# Patient Record
Sex: FEMALE | Race: WHITE | Employment: FULL TIME
[De-identification: names, ages, dates, MRNs, and addresses within clinical notes are randomized per-mention and may not be internally consistent; named-entity substitution may affect disease eponyms.]

---

## 2022-03-18 PROBLEM — K81.9 CHOLECYSTITIS: Status: ACTIVE | Noted: 2017-03-11

## 2022-03-18 PROBLEM — K85.10 ACUTE BILIARY PANCREATITIS WITHOUT INFECTION OR NECROSIS: Status: ACTIVE | Noted: 2017-03-11

## 2022-03-19 PROBLEM — E66.9 OBESITY: Status: ACTIVE | Noted: 2017-03-11

## 2022-03-19 PROBLEM — N80.03 ADENOMYOSIS: Status: ACTIVE | Noted: 2018-01-05

## 2022-03-19 PROBLEM — Z90.710 S/P HYSTERECTOMY: Status: ACTIVE | Noted: 2018-01-04

## 2022-03-19 PROBLEM — E66.01 MORBID OBESITY DUE TO EXCESS CALORIES (HCC): Status: ACTIVE | Noted: 2017-03-11

## 2022-03-19 PROBLEM — I10 ESSENTIAL HYPERTENSION: Status: ACTIVE | Noted: 2017-03-11

## 2022-03-20 PROBLEM — R10.2 PELVIC PAIN: Status: ACTIVE | Noted: 2018-01-05

## 2022-03-20 PROBLEM — I25.10 CAD (CORONARY ARTERY DISEASE): Status: ACTIVE | Noted: 2017-03-11

## 2022-03-20 PROBLEM — K85.90 PANCREATITIS: Status: ACTIVE | Noted: 2017-03-11

## 2023-05-22 ENCOUNTER — APPOINTMENT (OUTPATIENT)
Facility: HOSPITAL | Age: 48
DRG: 066 | End: 2023-05-22
Payer: COMMERCIAL

## 2023-05-22 ENCOUNTER — HOSPITAL ENCOUNTER (INPATIENT)
Facility: HOSPITAL | Age: 48
LOS: 1 days | Discharge: HOME | DRG: 066 | End: 2023-05-24
Attending: EMERGENCY MEDICINE | Admitting: STUDENT IN AN ORGANIZED HEALTH CARE EDUCATION/TRAINING PROGRAM
Payer: COMMERCIAL

## 2023-05-22 DIAGNOSIS — G45.1 TRANSIENT ISCHEMIC ATTACK INVOLVING LEFT INTERNAL CAROTID ARTERY: ICD-10-CM

## 2023-05-22 DIAGNOSIS — I65.22 STENOSIS OF LEFT CAROTID ARTERY: ICD-10-CM

## 2023-05-22 DIAGNOSIS — G45.9 TIA (TRANSIENT ISCHEMIC ATTACK): Primary | ICD-10-CM

## 2023-05-22 DIAGNOSIS — R20.2 PARESTHESIA: ICD-10-CM

## 2023-05-22 DIAGNOSIS — I65.23 BILATERAL CAROTID ARTERY STENOSIS: ICD-10-CM

## 2023-05-22 LAB
ALBUMIN SERPL-MCNC: 4.3 G/DL (ref 3.5–5)
ALBUMIN/GLOB SERPL: 1.1 (ref 1.1–2.2)
ALP SERPL-CCNC: 68 U/L (ref 45–117)
ALT SERPL-CCNC: 36 U/L (ref 12–78)
ANION GAP SERPL CALC-SCNC: 5 MMOL/L (ref 5–15)
AST SERPL-CCNC: 25 U/L (ref 15–37)
BASOPHILS # BLD: 0.1 K/UL (ref 0–0.1)
BASOPHILS NFR BLD: 1 % (ref 0–1)
BILIRUB SERPL-MCNC: 0.5 MG/DL (ref 0.2–1)
BUN SERPL-MCNC: 23 MG/DL (ref 6–20)
BUN/CREAT SERPL: 21 (ref 12–20)
CALCIUM SERPL-MCNC: 9.7 MG/DL (ref 8.5–10.1)
CHLORIDE SERPL-SCNC: 104 MMOL/L (ref 97–108)
CO2 SERPL-SCNC: 28 MMOL/L (ref 21–32)
COMMENT:: NORMAL
CREAT SERPL-MCNC: 1.07 MG/DL (ref 0.55–1.02)
DIFFERENTIAL METHOD BLD: ABNORMAL
EOSINOPHIL # BLD: 0.4 K/UL (ref 0–0.4)
EOSINOPHIL NFR BLD: 4 % (ref 0–7)
ERYTHROCYTE [DISTWIDTH] IN BLOOD BY AUTOMATED COUNT: 11.7 % (ref 11.5–14.5)
GLOBULIN SER CALC-MCNC: 3.8 G/DL (ref 2–4)
GLUCOSE SERPL-MCNC: 131 MG/DL (ref 65–100)
HCT VFR BLD AUTO: 43 % (ref 35–47)
HGB BLD-MCNC: 14.7 G/DL (ref 11.5–16)
IMM GRANULOCYTES # BLD AUTO: 0.1 K/UL (ref 0–0.04)
IMM GRANULOCYTES NFR BLD AUTO: 1 % (ref 0–0.5)
LYMPHOCYTES # BLD: 4.9 K/UL (ref 0.8–3.5)
LYMPHOCYTES NFR BLD: 40 % (ref 12–49)
MCH RBC QN AUTO: 32 PG (ref 26–34)
MCHC RBC AUTO-ENTMCNC: 34.2 G/DL (ref 30–36.5)
MCV RBC AUTO: 93.5 FL (ref 80–99)
MONOCYTES # BLD: 0.7 K/UL (ref 0–1)
MONOCYTES NFR BLD: 6 % (ref 5–13)
NEUTS SEG # BLD: 6 K/UL (ref 1.8–8)
NEUTS SEG NFR BLD: 49 % (ref 32–75)
NRBC # BLD: 0 K/UL (ref 0–0.01)
NRBC BLD-RTO: 0 PER 100 WBC
PLATELET # BLD AUTO: 389 K/UL (ref 150–400)
POTASSIUM SERPL-SCNC: 4.3 MMOL/L (ref 3.5–5.1)
PROT SERPL-MCNC: 8.1 G/DL (ref 6.4–8.2)
RBC # BLD AUTO: 4.6 M/UL (ref 3.8–5.2)
SODIUM SERPL-SCNC: 137 MMOL/L (ref 136–145)
SPECIMEN HOLD: NORMAL
WBC # BLD AUTO: 12.2 K/UL (ref 3.6–11)

## 2023-05-22 PROCEDURE — 6360000002 HC RX W HCPCS: Performed by: EMERGENCY MEDICINE

## 2023-05-22 PROCEDURE — 93005 ELECTROCARDIOGRAM TRACING: CPT | Performed by: EMERGENCY MEDICINE

## 2023-05-22 PROCEDURE — 96375 TX/PRO/DX INJ NEW DRUG ADDON: CPT

## 2023-05-22 PROCEDURE — 6360000004 HC RX CONTRAST MEDICATION: Performed by: EMERGENCY MEDICINE

## 2023-05-22 PROCEDURE — 36415 COLL VENOUS BLD VENIPUNCTURE: CPT

## 2023-05-22 PROCEDURE — 96374 THER/PROPH/DIAG INJ IV PUSH: CPT

## 2023-05-22 PROCEDURE — 70450 CT HEAD/BRAIN W/O DYE: CPT

## 2023-05-22 PROCEDURE — 85025 COMPLETE CBC W/AUTO DIFF WBC: CPT

## 2023-05-22 PROCEDURE — 99285 EMERGENCY DEPT VISIT HI MDM: CPT

## 2023-05-22 PROCEDURE — 80053 COMPREHEN METABOLIC PANEL: CPT

## 2023-05-22 PROCEDURE — 6370000000 HC RX 637 (ALT 250 FOR IP): Performed by: EMERGENCY MEDICINE

## 2023-05-22 PROCEDURE — 70496 CT ANGIOGRAPHY HEAD: CPT

## 2023-05-22 RX ORDER — KETOROLAC TROMETHAMINE 30 MG/ML
15 INJECTION, SOLUTION INTRAMUSCULAR; INTRAVENOUS
Status: COMPLETED | OUTPATIENT
Start: 2023-05-22 | End: 2023-05-22

## 2023-05-22 RX ORDER — ASPIRIN 81 MG/1
162 TABLET, CHEWABLE ORAL ONCE
Status: COMPLETED | OUTPATIENT
Start: 2023-05-22 | End: 2023-05-22

## 2023-05-22 RX ORDER — PROCHLORPERAZINE EDISYLATE 5 MG/ML
5 INJECTION INTRAMUSCULAR; INTRAVENOUS ONCE
Status: COMPLETED | OUTPATIENT
Start: 2023-05-22 | End: 2023-05-22

## 2023-05-22 RX ORDER — DIPHENHYDRAMINE HYDROCHLORIDE 50 MG/ML
25 INJECTION INTRAMUSCULAR; INTRAVENOUS
Status: COMPLETED | OUTPATIENT
Start: 2023-05-22 | End: 2023-05-22

## 2023-05-22 RX ADMIN — PROCHLORPERAZINE EDISYLATE 5 MG: 5 INJECTION INTRAMUSCULAR; INTRAVENOUS at 20:44

## 2023-05-22 RX ADMIN — IOPAMIDOL 100 ML: 755 INJECTION, SOLUTION INTRAVENOUS at 21:28

## 2023-05-22 RX ADMIN — ASPIRIN 162 MG: 81 TABLET, CHEWABLE ORAL at 22:10

## 2023-05-22 RX ADMIN — KETOROLAC TROMETHAMINE 15 MG: 30 INJECTION, SOLUTION INTRAMUSCULAR; INTRAVENOUS at 21:49

## 2023-05-22 RX ADMIN — DIPHENHYDRAMINE HYDROCHLORIDE 25 MG: 50 INJECTION, SOLUTION INTRAMUSCULAR; INTRAVENOUS at 20:44

## 2023-05-22 ASSESSMENT — PAIN SCALES - GENERAL
PAINLEVEL_OUTOF10: 5
PAINLEVEL_OUTOF10: 5

## 2023-05-22 ASSESSMENT — LIFESTYLE VARIABLES
HOW OFTEN DO YOU HAVE A DRINK CONTAINING ALCOHOL: NEVER
HOW MANY STANDARD DRINKS CONTAINING ALCOHOL DO YOU HAVE ON A TYPICAL DAY: PATIENT DOES NOT DRINK

## 2023-05-22 ASSESSMENT — PAIN - FUNCTIONAL ASSESSMENT: PAIN_FUNCTIONAL_ASSESSMENT: 0-10

## 2023-05-22 NOTE — ED NOTES
Verbal shift change report given to Ophelia Vega RN (oncoming nurse) by Avril Perez RN (offgoing nurse). Report included the following information ED Encounter Summary, ED SBAR, [facility], Recent Results, and Neuro Assessment.       Hanna Burroughs RN  05/22/23 8015

## 2023-05-22 NOTE — ED PROVIDER NOTES
0.1 (H) 0.00 - 0.04 K/UL    Differential Type AUTOMATED     Extra Tubes Hold    Collection Time: 05/22/23  6:41 PM   Result Value Ref Range    Specimen HOld SST RED PST     Comment:        Add-on orders for these samples will be processed based on acceptable specimen integrity and analyte stability, which may vary by analyte. EKG 12 Lead    Collection Time: 05/22/23  7:09 PM   Result Value Ref Range    Ventricular Rate 77 BPM    Atrial Rate 77 BPM    P-R Interval 166 ms    QRS Duration 72 ms    Q-T Interval 378 ms    QTc Calculation (Bazett) 427 ms    P Axis 13 degrees    R Axis 7 degrees    T Axis 50 degrees    Diagnosis       Normal sinus rhythm  Inferior infarct , age undetermined  Anteroseptal infarct (cited on or before 22-MAY-2023)  When compared with ECG of 19-FEB-2019 20:33,  Inferior infarct is now present         EKG: If performed, independent interpretation documented below in the MDM section     RADIOLOGY:  Non-plain film images such as CT, Ultrasound and MRI are read by the radiologist. Plain radiographic images are visualized and preliminarily interpreted by the ED Provider with the findings documented in the MDM section. Interpretation per the Radiologist below, if available at the time of this note:     CT HEAD WO CONTRAST   Final Result   Negative. CTA HEAD NECK W CONTRAST   Final Result   Mild stenosis of the left carotid bifurcation.            PROCEDURES   Unless otherwise noted below, none  Procedures     CRITICAL CARE TIME   0    EMERGENCY DEPARTMENT COURSE and DIFFERENTIAL DIAGNOSIS/MDM   Vitals:    Vitals:    05/22/23 2100 05/22/23 2107 05/22/23 2145 05/22/23 2200   BP:  132/80 135/78 (!) 145/75   Pulse: 85 80  76   Resp: 10 16  17   Temp:       TempSrc:       SpO2: 95% 97%  97%   Weight:       Height:            Patient was given the following medications:  Medications   prochlorperazine (COMPAZINE) injection 5 mg (5 mg IntraVENous Given 5/22/23 2044)   diphenhydrAMINE (BENADRYL)

## 2023-05-23 ENCOUNTER — APPOINTMENT (OUTPATIENT)
Facility: HOSPITAL | Age: 48
DRG: 066 | End: 2023-05-23
Payer: COMMERCIAL

## 2023-05-23 ENCOUNTER — APPOINTMENT (OUTPATIENT)
Facility: HOSPITAL | Age: 48
DRG: 066 | End: 2023-05-23
Attending: STUDENT IN AN ORGANIZED HEALTH CARE EDUCATION/TRAINING PROGRAM
Payer: COMMERCIAL

## 2023-05-23 ENCOUNTER — APPOINTMENT (OUTPATIENT)
Facility: HOSPITAL | Age: 48
DRG: 066 | End: 2023-05-23
Attending: PSYCHIATRY & NEUROLOGY
Payer: COMMERCIAL

## 2023-05-23 PROBLEM — I63.312 THROMBOTIC STROKE INVOLVING LEFT MIDDLE CEREBRAL ARTERY (HCC): Status: ACTIVE | Noted: 2023-05-23

## 2023-05-23 PROBLEM — I61.9 CVA (CEREBROVASCULAR ACCIDENT DUE TO INTRACEREBRAL HEMORRHAGE) (HCC): Status: ACTIVE | Noted: 2023-05-23

## 2023-05-23 PROBLEM — G45.1 TRANSIENT ISCHEMIC ATTACK INVOLVING LEFT INTERNAL CAROTID ARTERY: Status: ACTIVE | Noted: 2023-05-23

## 2023-05-23 PROBLEM — E11.42 DIABETIC PERIPHERAL NEUROPATHY ASSOCIATED WITH TYPE 2 DIABETES MELLITUS (HCC): Status: ACTIVE | Noted: 2023-05-23

## 2023-05-23 PROBLEM — R20.0 RIGHT SIDED NUMBNESS: Status: ACTIVE | Noted: 2023-05-23

## 2023-05-23 PROBLEM — I65.23 BILATERAL CAROTID ARTERY STENOSIS: Status: ACTIVE | Noted: 2023-05-23

## 2023-05-23 LAB
ANION GAP SERPL CALC-SCNC: 5 MMOL/L (ref 5–15)
BUN SERPL-MCNC: 25 MG/DL (ref 6–20)
BUN/CREAT SERPL: 25 (ref 12–20)
CALCIUM SERPL-MCNC: 8.6 MG/DL (ref 8.5–10.1)
CHLORIDE SERPL-SCNC: 107 MMOL/L (ref 97–108)
CHOLEST SERPL-MCNC: 126 MG/DL
CO2 SERPL-SCNC: 26 MMOL/L (ref 21–32)
CREAT SERPL-MCNC: 1 MG/DL (ref 0.55–1.02)
ECHO AO ROOT DIAM: 2.9 CM
ECHO AO ROOT INDEX: 1.58 CM/M2
ECHO AR MAX VEL PISA: 4.1 M/S
ECHO AV AREA PEAK VELOCITY: 2.4 CM2
ECHO AV AREA/BSA PEAK VELOCITY: 1.3 CM2/M2
ECHO AV PEAK GRADIENT: 11 MMHG
ECHO AV PEAK VELOCITY: 1.7 M/S
ECHO AV REGURGITANT PHT: 765.2 MILLISECOND
ECHO AV VELOCITY RATIO: 0.71
ECHO BSA: 1.87 M2
ECHO BSA: 1.87 M2
ECHO LA DIAMETER INDEX: 1.8 CM/M2
ECHO LA DIAMETER: 3.3 CM
ECHO LA TO AORTIC ROOT RATIO: 1.14
ECHO LV EDV A4C: 92 ML
ECHO LV EDV INDEX A4C: 50 ML/M2
ECHO LV EJECTION FRACTION A4C: 73 %
ECHO LV ESV A4C: 25 ML
ECHO LV ESV INDEX A4C: 14 ML/M2
ECHO LV FRACTIONAL SHORTENING: 41 % (ref 28–44)
ECHO LV INTERNAL DIMENSION DIASTOLE INDEX: 2.02 CM/M2
ECHO LV INTERNAL DIMENSION DIASTOLIC: 3.7 CM (ref 3.9–5.3)
ECHO LV INTERNAL DIMENSION SYSTOLIC INDEX: 1.2 CM/M2
ECHO LV INTERNAL DIMENSION SYSTOLIC: 2.2 CM
ECHO LV IVSD: 1.3 CM (ref 0.6–0.9)
ECHO LV MASS 2D: 138.2 G (ref 67–162)
ECHO LV MASS INDEX 2D: 75.5 G/M2 (ref 43–95)
ECHO LV POSTERIOR WALL DIASTOLIC: 1 CM (ref 0.6–0.9)
ECHO LV RELATIVE WALL THICKNESS RATIO: 0.54
ECHO LVOT AREA: 3.1 CM2
ECHO LVOT DIAM: 2 CM
ECHO LVOT PEAK GRADIENT: 6 MMHG
ECHO LVOT PEAK VELOCITY: 1.2 M/S
EKG ATRIAL RATE: 77 BPM
EKG DIAGNOSIS: NORMAL
EKG P AXIS: 13 DEGREES
EKG P-R INTERVAL: 166 MS
EKG Q-T INTERVAL: 378 MS
EKG QRS DURATION: 72 MS
EKG QTC CALCULATION (BAZETT): 427 MS
EKG R AXIS: 7 DEGREES
EKG T AXIS: 50 DEGREES
EKG VENTRICULAR RATE: 77 BPM
ERYTHROCYTE [DISTWIDTH] IN BLOOD BY AUTOMATED COUNT: 11.4 % (ref 11.5–14.5)
ERYTHROCYTE [SEDIMENTATION RATE] IN BLOOD: 11 MM/HR (ref 0–20)
EST. AVERAGE GLUCOSE BLD GHB EST-MCNC: 154 MG/DL
GLUCOSE BLD STRIP.AUTO-MCNC: 102 MG/DL (ref 65–117)
GLUCOSE BLD STRIP.AUTO-MCNC: 134 MG/DL (ref 65–117)
GLUCOSE BLD STRIP.AUTO-MCNC: 157 MG/DL (ref 65–117)
GLUCOSE BLD STRIP.AUTO-MCNC: 186 MG/DL (ref 65–117)
GLUCOSE BLD STRIP.AUTO-MCNC: 196 MG/DL (ref 65–117)
GLUCOSE SERPL-MCNC: 106 MG/DL (ref 65–100)
HBA1C MFR BLD: 7 % (ref 4–5.6)
HCT VFR BLD AUTO: 38 % (ref 35–47)
HDLC SERPL-MCNC: 46 MG/DL
HDLC SERPL: 2.7 (ref 0–5)
HGB BLD-MCNC: 13.6 G/DL (ref 11.5–16)
LDLC SERPL CALC-MCNC: 35.2 MG/DL (ref 0–100)
MCH RBC QN AUTO: 33.1 PG (ref 26–34)
MCHC RBC AUTO-ENTMCNC: 35.8 G/DL (ref 30–36.5)
MCV RBC AUTO: 92.5 FL (ref 80–99)
NRBC # BLD: 0 K/UL (ref 0–0.01)
NRBC BLD-RTO: 0 PER 100 WBC
PLATELET # BLD AUTO: 366 K/UL (ref 150–400)
PMV BLD AUTO: 10.2 FL (ref 8.9–12.9)
POTASSIUM SERPL-SCNC: 4.1 MMOL/L (ref 3.5–5.1)
RBC # BLD AUTO: 4.11 M/UL (ref 3.8–5.2)
SERVICE CMNT-IMP: ABNORMAL
SERVICE CMNT-IMP: NORMAL
SODIUM SERPL-SCNC: 138 MMOL/L (ref 136–145)
TRIGL SERPL-MCNC: 224 MG/DL
VAS LEFT CCA DIST EDV: 0 CM/S
VAS LEFT CCA DIST PSV: 63.8 CM/S
VAS LEFT CCA PROX EDV: 0 CM/S
VAS LEFT CCA PROX PSV: 82 CM/S
VAS LEFT ECA EDV: 0 CM/S
VAS LEFT ECA PSV: 127.7 CM/S
VAS LEFT ICA DIST EDV: 12.9 CM/S
VAS LEFT ICA DIST PSV: 40.3 CM/S
VAS LEFT ICA MID EDV: 12.9 CM/S
VAS LEFT ICA MID PSV: 44.2 CM/S
VAS LEFT ICA PROX EDV: 18.1 CM/S
VAS LEFT ICA PROX PSV: 70.3 CM/S
VAS LEFT ICA/CCA PSV: 1.1 NO UNITS
VAS LEFT SUBCLAVIAN PROX EDV: 0 CM/S
VAS LEFT SUBCLAVIAN PROX PSV: 50.7 CM/S
VAS LEFT VERTEBRAL EDV: 8.9 CM/S
VAS LEFT VERTEBRAL PSV: 38.2 CM/S
VAS RIGHT CCA DIST EDV: 26 CM/S
VAS RIGHT CCA DIST PSV: 97.7 CM/S
VAS RIGHT CCA PROX EDV: 20.7 CM/S
VAS RIGHT CCA PROX PSV: 84.6 CM/S
VAS RIGHT ECA EDV: 0 CM/S
VAS RIGHT ECA PSV: 101.6 CM/S
VAS RIGHT ICA DIST EDV: 35.1 CM/S
VAS RIGHT ICA DIST PSV: 70.3 CM/S
VAS RIGHT ICA MID EDV: 32.5 CM/S
VAS RIGHT ICA MID PSV: 62.5 CM/S
VAS RIGHT ICA PROX EDV: 27.3 CM/S
VAS RIGHT ICA PROX PSV: 66.4 CM/S
VAS RIGHT ICA/CCA PSV: 0.7 NO UNITS
VAS RIGHT SUBCLAVIAN PROX EDV: 0 CM/S
VAS RIGHT SUBCLAVIAN PROX PSV: 87.3 CM/S
VAS RIGHT VERTEBRAL EDV: 18.1 CM/S
VAS RIGHT VERTEBRAL PSV: 46.8 CM/S
VLDLC SERPL CALC-MCNC: 44.8 MG/DL
WBC # BLD AUTO: 11.7 K/UL (ref 3.6–11)

## 2023-05-23 PROCEDURE — 93308 TTE F-UP OR LMTD: CPT

## 2023-05-23 PROCEDURE — 99222 1ST HOSP IP/OBS MODERATE 55: CPT | Performed by: PSYCHIATRY & NEUROLOGY

## 2023-05-23 PROCEDURE — A9579 GAD-BASE MR CONTRAST NOS,1ML: HCPCS | Performed by: PSYCHIATRY & NEUROLOGY

## 2023-05-23 PROCEDURE — 92523 SPEECH SOUND LANG COMPREHEN: CPT

## 2023-05-23 PROCEDURE — 36415 COLL VENOUS BLD VENIPUNCTURE: CPT

## 2023-05-23 PROCEDURE — 80048 BASIC METABOLIC PNL TOTAL CA: CPT

## 2023-05-23 PROCEDURE — 83516 IMMUNOASSAY NONANTIBODY: CPT

## 2023-05-23 PROCEDURE — 97530 THERAPEUTIC ACTIVITIES: CPT

## 2023-05-23 PROCEDURE — 97112 NEUROMUSCULAR REEDUCATION: CPT

## 2023-05-23 PROCEDURE — 93880 EXTRACRANIAL BILAT STUDY: CPT | Performed by: PSYCHIATRY & NEUROLOGY

## 2023-05-23 PROCEDURE — 6360000002 HC RX W HCPCS: Performed by: STUDENT IN AN ORGANIZED HEALTH CARE EDUCATION/TRAINING PROGRAM

## 2023-05-23 PROCEDURE — 6360000004 HC RX CONTRAST MEDICATION: Performed by: PSYCHIATRY & NEUROLOGY

## 2023-05-23 PROCEDURE — 93880 EXTRACRANIAL BILAT STUDY: CPT

## 2023-05-23 PROCEDURE — 97161 PT EVAL LOW COMPLEX 20 MIN: CPT

## 2023-05-23 PROCEDURE — 85652 RBC SED RATE AUTOMATED: CPT

## 2023-05-23 PROCEDURE — 86235 NUCLEAR ANTIGEN ANTIBODY: CPT

## 2023-05-23 PROCEDURE — 1100000000 HC RM PRIVATE

## 2023-05-23 PROCEDURE — 86225 DNA ANTIBODY NATIVE: CPT

## 2023-05-23 PROCEDURE — 6370000000 HC RX 637 (ALT 250 FOR IP): Performed by: INTERNAL MEDICINE

## 2023-05-23 PROCEDURE — 70553 MRI BRAIN STEM W/O & W/DYE: CPT

## 2023-05-23 PROCEDURE — 85027 COMPLETE CBC AUTOMATED: CPT

## 2023-05-23 PROCEDURE — 6370000000 HC RX 637 (ALT 250 FOR IP): Performed by: STUDENT IN AN ORGANIZED HEALTH CARE EDUCATION/TRAINING PROGRAM

## 2023-05-23 PROCEDURE — 97165 OT EVAL LOW COMPLEX 30 MIN: CPT

## 2023-05-23 PROCEDURE — 82962 GLUCOSE BLOOD TEST: CPT

## 2023-05-23 PROCEDURE — 80061 LIPID PANEL: CPT

## 2023-05-23 PROCEDURE — 83036 HEMOGLOBIN GLYCOSYLATED A1C: CPT

## 2023-05-23 RX ORDER — ATORVASTATIN CALCIUM 40 MG/1
40 TABLET, FILM COATED ORAL NIGHTLY
Status: DISCONTINUED | OUTPATIENT
Start: 2023-05-23 | End: 2023-05-24 | Stop reason: HOSPADM

## 2023-05-23 RX ORDER — POLYETHYLENE GLYCOL 3350 17 G/17G
17 POWDER, FOR SOLUTION ORAL DAILY PRN
Status: DISCONTINUED | OUTPATIENT
Start: 2023-05-23 | End: 2023-05-24 | Stop reason: HOSPADM

## 2023-05-23 RX ORDER — ASPIRIN 81 MG/1
81 TABLET ORAL DAILY
Status: DISCONTINUED | OUTPATIENT
Start: 2023-05-23 | End: 2023-05-23

## 2023-05-23 RX ORDER — ASPIRIN 300 MG/1
300 SUPPOSITORY RECTAL DAILY
Status: DISCONTINUED | OUTPATIENT
Start: 2023-05-23 | End: 2023-05-23

## 2023-05-23 RX ORDER — ONDANSETRON 4 MG/1
4 TABLET, ORALLY DISINTEGRATING ORAL EVERY 8 HOURS PRN
Status: DISCONTINUED | OUTPATIENT
Start: 2023-05-23 | End: 2023-05-24 | Stop reason: HOSPADM

## 2023-05-23 RX ORDER — INSULIN LISPRO 100 [IU]/ML
0-4 INJECTION, SOLUTION INTRAVENOUS; SUBCUTANEOUS NIGHTLY
Status: DISCONTINUED | OUTPATIENT
Start: 2023-05-23 | End: 2023-05-24 | Stop reason: HOSPADM

## 2023-05-23 RX ORDER — ISOSORBIDE MONONITRATE 30 MG/1
30 TABLET, EXTENDED RELEASE ORAL DAILY
Status: DISCONTINUED | OUTPATIENT
Start: 2023-05-23 | End: 2023-05-24 | Stop reason: HOSPADM

## 2023-05-23 RX ORDER — INSULIN LISPRO 100 [IU]/ML
0-8 INJECTION, SOLUTION INTRAVENOUS; SUBCUTANEOUS
Status: DISCONTINUED | OUTPATIENT
Start: 2023-05-23 | End: 2023-05-24 | Stop reason: HOSPADM

## 2023-05-23 RX ORDER — METOPROLOL SUCCINATE 25 MG/1
25 TABLET, EXTENDED RELEASE ORAL 2 TIMES DAILY
Status: DISCONTINUED | OUTPATIENT
Start: 2023-05-23 | End: 2023-05-24 | Stop reason: HOSPADM

## 2023-05-23 RX ORDER — DEXTROSE MONOHYDRATE 100 MG/ML
INJECTION, SOLUTION INTRAVENOUS CONTINUOUS PRN
Status: DISCONTINUED | OUTPATIENT
Start: 2023-05-23 | End: 2023-05-24 | Stop reason: HOSPADM

## 2023-05-23 RX ORDER — SEMAGLUTIDE 1.34 MG/ML
1 INJECTION, SOLUTION SUBCUTANEOUS WEEKLY
COMMUNITY

## 2023-05-23 RX ORDER — ENOXAPARIN SODIUM 100 MG/ML
40 INJECTION SUBCUTANEOUS DAILY
Status: DISCONTINUED | OUTPATIENT
Start: 2023-05-23 | End: 2023-05-24 | Stop reason: HOSPADM

## 2023-05-23 RX ORDER — ASPIRIN 81 MG/1
81 TABLET, CHEWABLE ORAL DAILY
Status: DISCONTINUED | OUTPATIENT
Start: 2023-05-23 | End: 2023-05-24 | Stop reason: HOSPADM

## 2023-05-23 RX ORDER — ONDANSETRON 2 MG/ML
4 INJECTION INTRAMUSCULAR; INTRAVENOUS EVERY 6 HOURS PRN
Status: DISCONTINUED | OUTPATIENT
Start: 2023-05-23 | End: 2023-05-24 | Stop reason: HOSPADM

## 2023-05-23 RX ORDER — CLOPIDOGREL BISULFATE 75 MG/1
75 TABLET ORAL DAILY
Status: DISCONTINUED | OUTPATIENT
Start: 2023-05-23 | End: 2023-05-24 | Stop reason: HOSPADM

## 2023-05-23 RX ORDER — ACETAMINOPHEN 325 MG/1
650 TABLET ORAL EVERY 6 HOURS PRN
Status: DISCONTINUED | OUTPATIENT
Start: 2023-05-23 | End: 2023-05-24 | Stop reason: HOSPADM

## 2023-05-23 RX ADMIN — GADOTERIDOL 12 ML: 279.3 INJECTION, SOLUTION INTRAVENOUS at 10:19

## 2023-05-23 RX ADMIN — SERTRALINE 150 MG: 50 TABLET, FILM COATED ORAL at 09:14

## 2023-05-23 RX ADMIN — CLOPIDOGREL BISULFATE 75 MG: 75 TABLET ORAL at 09:14

## 2023-05-23 RX ADMIN — METOPROLOL SUCCINATE 25 MG: 25 TABLET, EXTENDED RELEASE ORAL at 09:14

## 2023-05-23 RX ADMIN — ATORVASTATIN CALCIUM 40 MG: 40 TABLET, FILM COATED ORAL at 20:12

## 2023-05-23 RX ADMIN — ISOSORBIDE MONONITRATE 30 MG: 30 TABLET, EXTENDED RELEASE ORAL at 09:14

## 2023-05-23 RX ADMIN — ASPIRIN 81 MG: 81 TABLET, CHEWABLE ORAL at 09:14

## 2023-05-23 RX ADMIN — ENOXAPARIN SODIUM 40 MG: 100 INJECTION SUBCUTANEOUS at 09:14

## 2023-05-23 RX ADMIN — ACETAMINOPHEN 650 MG: 325 TABLET ORAL at 17:35

## 2023-05-23 ASSESSMENT — PAIN DESCRIPTION - LOCATION
LOCATION: HEAD
LOCATION: HEAD

## 2023-05-23 ASSESSMENT — PAIN SCALES - GENERAL
PAINLEVEL_OUTOF10: 0
PAINLEVEL_OUTOF10: 6
PAINLEVEL_OUTOF10: 5

## 2023-05-23 ASSESSMENT — PAIN DESCRIPTION - ORIENTATION: ORIENTATION: MID

## 2023-05-23 ASSESSMENT — PAIN DESCRIPTION - ONSET: ONSET: GRADUAL

## 2023-05-23 ASSESSMENT — PAIN DESCRIPTION - DESCRIPTORS: DESCRIPTORS: ACHING

## 2023-05-23 ASSESSMENT — PAIN DESCRIPTION - FREQUENCY: FREQUENCY: CONTINUOUS

## 2023-05-23 ASSESSMENT — PAIN DESCRIPTION - PAIN TYPE: TYPE: ACUTE PAIN

## 2023-05-23 NOTE — ED NOTES
1950: Assumed care of pt at this time. Pt on monitor x3 with call bell and friend at bedside. Plan of care devised with pt and all questions answered. Pt provided with blankets and pillow at this time for comfort. MD notified about pt's request for medication for headache. Mary Sweet RN  05/22/23 2002 2050: Delay in CT d/t lack of IV access. This RN placed new IV and called CT to obtain CT now that access has been obtained      Mary Sweet RN  05/22/23 1539    2125: Pt to [address] [place] [address] [place], RN  05/22/23 2134    2144: Pt back from CT and ambulatory to bathroom at this time w/o assistance. Mary Sweet RN  05/22/23 0104 1453: Bedside shift change report given to [address] [place] [address] (oncoming nurse) by Jace Loredo (offgoing nurse). Report included the following information Nurse Handoff Report, Index, ED SBAR, Intake/Output, MAR, Recent Results, and Cardiac Rhythm SINUS .           Mary Sweet RN  05/22/23 2924

## 2023-05-23 NOTE — PLAN OF CARE
Problem: Neurosensory - Adult  Goal: Achieves stable or improved neurological status  5/23/2023 1010 by Shelly Tanner RN  Outcome: Progressing

## 2023-05-23 NOTE — CARE COORDINATION
05/23/23 1520   Service Assessment   Patient Orientation Alert and Oriented   Cognition Alert   History Provided By Patient   Primary Caregiver Self   Support Systems Spouse/Significant Other   PCP Verified by CM Yes  Rosalio Pitts MD)   Last Visit to PCP Within last 3 months  (yesterday)   Prior Functional Level Independent in ADLs/IADLs   Current Functional Level Independent in ADLs/IADLs   Can patient return to prior living arrangement Yes   Ability to make needs known: Good   Family able to assist with home care needs: Yes   Would you like for me to discuss the discharge plan with any other family members/significant others, and if so, who? Yes  (spouse if needed)   Social/Functional History   Lives With Spouse   Type of [address] One level  (no stairs)   ADL Assistance Independent   Homemaking Assistance Independent   Homemaking Responsibilities Yes   Ambulation Assistance Independent   Transfer Assistance Independent   Active  Yes   Mode of Transportation Car   Occupation Full time employment   Type of Occupation titel    Discharge Planning   Type of [address] Medications No   [address] Discharge   [address] Provided? No   Mode of Transport at Discharge Other (see comment)  (family)   Condition of Participation: Discharge Planning   The Patient and/or Patient Representative was provided with a Choice of Provider? Patient   The Patient and/Or Patient Representative agree with the Discharge Plan? Yes   Freedom of Choice list was provided with basic dialogue that supports the patient's individualized plan of care/goals, treatment preferences, and shares the quality data associated with the providers? Yes         I met with patient in room-  she resides in a one level home with spouse. She states no stairs.   She is employed full time,  drives and is

## 2023-05-23 NOTE — CONSULTS
Consult  REFERRED BY:  Nithya Arias MD    CHIEF COMPLAINT: Right-sided numbness and tingling intermittently for several weeks. Subjective:     Ori Aguilar is a 57 y.o. right-handed  female, we are seeing a new patient, at the request of the hospitalist, for evaluation of some right-sided numbness and weakness has been coming and going about 5 or 6 times over the last several weeks but did occur yesterday when she was at work just doing her usual work activity and the right side got numb on the face arm and leg, and she came to the hospital for further evaluation. CTA of the head and neck is shows mild stenosis in the left carotid bifurcation that is less than 50% on the CTA and the carotid Doppler done today, and initial CT of the head was negative, but MRI of the brain does show a subacute lacunar type infarct in the left frontal region probably consistent with her symptoms. The patient feels back to normal now, and does not appear to have any objective neurologic deficit I can  on today. I will place her on aspirin and Plavix therapy, and high-dose statin, she could probably go home in a.m. if stable, if echocardiogram and telemetry is negative. She denies any cardiac symptoms at the onset of her symptoms yesterday, and has no other cardiac history. She does have a history of high blood pressure hyperlipidemia and diabetes, but does not smoke so she has 3 of the 4 main risk factors. We encouraged her to make sure she takes good care of those risk factors and monitor those with her PCP carefully. She looks a little depressed. .    Past Medical History:   Diagnosis Date    CAD (coronary artery disease)     Apical LAD disease only (2015): Med Rx. Depression     Previous rx.     Diabetes mellitus ([facility] [address]     since 19 yo; ?type 1 but takes metformin    Hyperlipidemia     Hypertension     Pancreatitis     Panic disorder (episodic paroxysmal anxiety) without agoraphobia       Past Surgical

## 2023-05-23 NOTE — H&P
Hospitalist Admission Note    NAME:  Janalee Denver   :  1975   MRN:  [id number]     Date/Time:  2023 1:19 AM    Patient PCP: Akash Garcia MD    ______________________________________________________________________  Given the patient's current clinical presentation, I have a high level of concern for decompensation if discharged from the emergency department. Complex decision making was performed, which includes reviewing the patient's available past medical records, laboratory results, and x-ray films. My assessment of this patient's clinical condition and my plan of care is as follows. Assessment / Plan: Active Problems:  TIA/CVA  CAD/PAD  Essential hypertension  Hyperlipidemia  Diabetes mellitus  MDD/SCAR    Plan:  TIA/CVA  CAD/PAD  Essential hypertension  Hyperlipidemia  Admit to telemetry monitoring  Neurology consulted, greatly appreciate their expertise  Continue PTA aspirin and Plavix  Obtain TTE  Obtain MRI brain  CT carotids demonstrating 50% stenosis of left carotid artery-consider outpatient vascular surgery consultation   PT/OT/SLP consulted  TIA/CVA order set utilized    Diabetes mellitus  Corrective coverage insulin  Accu-Cheks  Diabetic diet  Hypoglycemia protocol in place    MDD/SCAR  Continue PTA sertraline      Medical Decision Making:   I personally reviewed labs: Yes, as listed below  I personally reviewed imaging: CT head, CTA head and neck  Toxic drug monitoring: None  Discussed case with: ED provider. After discussion I am in agreement that acuity of patient's medical condition necessitates hospital stay. Code Status: Full  DVT Prophylaxis: Lovenox  GI Prophylaxis: Not indicated  Baseline:  Independent    Subjective:   CHIEF COMPLAINT: TIA/CVA    HISTORY OF PRESENT ILLNESS:     Janalee Denver is a 57 y.o.  female with PMHx significant for CAD, essential hypertension, hyperlipidemia, diabetes mellitus, MDD/SCAR who presents with complaints of right arm numbness and

## 2023-05-23 NOTE — ED NOTES
Bedside shift change report given to Marian MOREJON  (oncoming nurse) by Yandel Mai (offgoing nurse). Report included the following information Nurse Handoff Report, Index, ED SBAR, Intake/Output, MAR, Recent Results, and Cardiac Rhythm SINUS .         Mari Gomez RN  05/23/23 8432      Verbal from Marian MOREJON that pt can come w/o tele box and DARLEEN Bond has telebox waiting for her [facility] [address]  05/23/23 3355

## 2023-05-24 ENCOUNTER — TELEPHONE (OUTPATIENT)
Age: 48
End: 2023-05-24

## 2023-05-24 VITALS
HEART RATE: 72 BPM | DIASTOLIC BLOOD PRESSURE: 63 MMHG | TEMPERATURE: 98.4 F | BODY MASS INDEX: 29.19 KG/M2 | RESPIRATION RATE: 18 BRPM | SYSTOLIC BLOOD PRESSURE: 123 MMHG | OXYGEN SATURATION: 97 % | WEIGHT: 171 LBS | HEIGHT: 64 IN

## 2023-05-24 LAB
GLUCOSE BLD STRIP.AUTO-MCNC: 132 MG/DL (ref 65–117)
GLUCOSE BLD STRIP.AUTO-MCNC: 187 MG/DL (ref 65–117)
SERVICE CMNT-IMP: ABNORMAL
SERVICE CMNT-IMP: ABNORMAL

## 2023-05-24 PROCEDURE — 6370000000 HC RX 637 (ALT 250 FOR IP): Performed by: INTERNAL MEDICINE

## 2023-05-24 PROCEDURE — 82962 GLUCOSE BLOOD TEST: CPT

## 2023-05-24 PROCEDURE — 6360000002 HC RX W HCPCS: Performed by: STUDENT IN AN ORGANIZED HEALTH CARE EDUCATION/TRAINING PROGRAM

## 2023-05-24 PROCEDURE — 99232 SBSQ HOSP IP/OBS MODERATE 35: CPT | Performed by: PSYCHIATRY & NEUROLOGY

## 2023-05-24 PROCEDURE — 6370000000 HC RX 637 (ALT 250 FOR IP): Performed by: STUDENT IN AN ORGANIZED HEALTH CARE EDUCATION/TRAINING PROGRAM

## 2023-05-24 RX ORDER — ISOSORBIDE MONONITRATE 30 MG/1
30 TABLET, EXTENDED RELEASE ORAL DAILY
Qty: 30 TABLET | Refills: 1 | Status: SHIPPED | OUTPATIENT
Start: 2023-05-24

## 2023-05-24 RX ORDER — LISINOPRIL 10 MG/1
10 TABLET ORAL DAILY
Qty: 30 TABLET | Refills: 1 | Status: SHIPPED | OUTPATIENT
Start: 2023-05-24

## 2023-05-24 RX ORDER — ASPIRIN 81 MG/1
81 TABLET, CHEWABLE ORAL DAILY
Qty: 30 TABLET | Refills: 1 | Status: SHIPPED | OUTPATIENT
Start: 2023-06-25

## 2023-05-24 RX ORDER — ASPIRIN 325 MG
325 TABLET ORAL DAILY
Qty: 30 TABLET | Refills: 0 | Status: SHIPPED | OUTPATIENT
Start: 2023-05-24 | End: 2023-06-23

## 2023-05-24 RX ORDER — ASPIRIN 81 MG/1
81 TABLET, CHEWABLE ORAL DAILY
Qty: 30 TABLET | Refills: 1 | Status: SHIPPED | OUTPATIENT
Start: 2023-05-24 | End: 2023-05-24 | Stop reason: SDUPTHER

## 2023-05-24 RX ORDER — CLOPIDOGREL BISULFATE 75 MG/1
75 TABLET ORAL DAILY
Qty: 30 TABLET | Refills: 1 | Status: SHIPPED | OUTPATIENT
Start: 2023-05-24

## 2023-05-24 RX ORDER — METOPROLOL SUCCINATE 25 MG/1
25 TABLET, EXTENDED RELEASE ORAL 2 TIMES DAILY
Qty: 30 TABLET | Refills: 1 | Status: SHIPPED | OUTPATIENT
Start: 2023-05-24

## 2023-05-24 RX ORDER — ATORVASTATIN CALCIUM 80 MG/1
40 TABLET, FILM COATED ORAL DAILY
Qty: 30 TABLET | Refills: 1 | Status: SHIPPED | OUTPATIENT
Start: 2023-05-24

## 2023-05-24 RX ADMIN — ISOSORBIDE MONONITRATE 30 MG: 30 TABLET, EXTENDED RELEASE ORAL at 08:52

## 2023-05-24 RX ADMIN — METOPROLOL SUCCINATE 25 MG: 25 TABLET, EXTENDED RELEASE ORAL at 08:52

## 2023-05-24 RX ADMIN — ENOXAPARIN SODIUM 40 MG: 100 INJECTION SUBCUTANEOUS at 08:52

## 2023-05-24 RX ADMIN — ASPIRIN 81 MG: 81 TABLET, CHEWABLE ORAL at 08:52

## 2023-05-24 RX ADMIN — CLOPIDOGREL BISULFATE 75 MG: 75 TABLET ORAL at 08:52

## 2023-05-24 RX ADMIN — SERTRALINE 150 MG: 50 TABLET, FILM COATED ORAL at 08:52

## 2023-05-24 RX ADMIN — ACETAMINOPHEN 650 MG: 325 TABLET ORAL at 06:20

## 2023-05-24 ASSESSMENT — PAIN SCALES - GENERAL
PAINLEVEL_OUTOF10: 5
PAINLEVEL_OUTOF10: 3

## 2023-05-24 ASSESSMENT — PAIN DESCRIPTION - ORIENTATION: ORIENTATION: MID

## 2023-05-24 ASSESSMENT — PAIN DESCRIPTION - DESCRIPTORS
DESCRIPTORS: DULL
DESCRIPTORS: ACHING

## 2023-05-24 ASSESSMENT — PAIN DESCRIPTION - LOCATION
LOCATION: HEAD
LOCATION: HEAD;CHEST

## 2023-05-24 NOTE — PROGRESS NOTES
02:32  Dr. Jaime De La Paz made aware of change to [facility] assessment. No new orders received. 2324: Bedside shift change report given to Pierre Mitchell RN (oncoming nurse) by Ct Billingsley RN (offgoing nurse).  Report included the following information Nurse Handoff Report, Index, ED SBAR, Intake/Output, MAR, Recent Results, and Cardiac Rhythm SINUS
End of Shift Note    Bedside shift change report given to Gila Fry RN (oncoming nurse) by Darrell Cherry RN (offgoing nurse). Report included the following information SBAR, MAR, I&O      Shift worked:  Nights   Shift summary and any significant changes:     Pt states symptoms resolved. No significant events over night. Possible D/C tomorrow. Concerns for physician to address:  N/A   Zone phone for oncoming shift:   [phone]     Patient Information  Susy Walters  57 y.o.  5/22/2023  6:51 PM by Tess Teran DO. Gregor Bermudez was admitted from Home    Problem List  Patient Active Problem List    Diagnosis Date Noted    Bilateral carotid artery stenosis 05/23/2023    Transient ischemic attack involving left internal carotid artery 05/23/2023    Diabetic peripheral neuropathy associated with type 2 diabetes mellitus ([address] 05/23/2023    Thrombotic stroke involving left middle cerebral artery ([address] 05/23/2023    Right sided numbness 05/23/2023    Adenomyosis 01/05/2018    Pelvic pain 01/05/2018    S/P hysterectomy 01/04/2018    Acute biliary pancreatitis without infection or necrosis 03/11/2017    Cholecystitis 03/11/2017    Essential hypertension 03/11/2017    Morbid obesity due to excess calories ([address] 03/11/2017    Obesity 03/11/2017    CAD (coronary artery disease) 03/11/2017    Pancreatitis 03/11/2017    Unstable angina ([address] 05/30/2015     Past Medical History:   Diagnosis Date    CAD (coronary artery disease)     Apical LAD disease only (2015): Med Rx. Depression     Previous rx.     Diabetes mellitus ([address]     since 19 yo; ?type 1 but takes metformin    Hyperlipidemia     Hypertension     Pancreatitis     Panic disorder (episodic paroxysmal anxiety) without agoraphobia        Core Measures:  CVA: Yes Yes  CHF:No No  PNA:NoNo    Activity:  Activity: In bed  Number times ambulated in hallways past shift: 0  Number of times OOB to chair past shift: 3    Cardiac:   Cardiac Monitoring: Yes      Cardiac Rhythm:
Hospitalist Progress Note    NAME:   Wendi Henley   : 1975   MRN: [id number]     Date/Time: 2023 3:01 PM  Patient PCP: Ashlie Sood MD    Estimated discharge date:   Barriers: ECHO and Neuro clearance       Assessment / Plan:    Acute/subacute CVA involving left frontal lobe  -MRI positive for CVA  -Continue added aspirin and Plavix. -Hemoglobin A1c 7.0 and LDL is 35.2  -Continue Continue Lipitor but consider decreasing the dose since LDL is only 35  -2D echocardiogram is pending  -neurology consulted and appreciate recommendations  -PT OT consulted  -EKG shows normal sinus rhythm  -Neurology is recommending overnight telemetry monitoring  -she will need a hypercoagulable panel on outpatient basis since she is less than 50 and had an acute CVA    Diabetes mellitus  Corrective coverage insulin  Accu-Cheks  Diabetic diet  Hypoglycemia protocol in place     MDD/SCAR  Continue PTA sertraline        Medical Decision Making:   I personally reviewed labs: CBC, BMP  I personally reviewed imaging: MRI of the brain  Toxic drug monitoring: Monitor hemoglobin while on Lovenox  Discussed case with:         Code Status: Full code  DVT Prophylaxis: Lovenox  GI Prophylaxis:    Subjective:     Chief Complaint / Reason for Physician Visit  Right-sided weakness is resolved. Speech is back to her normal as well  No new weakness, tingling or numbness  Overnight events noted        Objective:     VITALS:   Last 24hrs VS reviewed since prior progress note.  Most recent are:  Patient Vitals for the past 24 hrs:   BP Temp Temp src Pulse Resp SpO2 Height Weight   23 1447 (!) 150/67 -- -- -- -- -- 1.626 m (5' 4.02\") 77.6 kg (171 lb)   23 1012 -- -- Oral -- -- -- -- --   23 0755 (!) 150/67 97.5 °F (36.4 °C) -- 78 17 96 % -- --   23 0413 (!) 143/83 97.9 °F (36.6 °C) -- 79 20 95 % -- --   23 0335 133/73 97.8 °F (36.6 °C) Oral 76 17 96 % -- --   23 0200 -- -- -- -- -- -- 1.626 m (5' 4\") --
MRI PENDING    Completion of MRI Screening Sheet    Fax to [phone] when completed    Please call [phone]  When this is done    Thank You
Occupational Therapy    Orders received, acknowledged, and patient chart reviewed up to date. Attempted visit, however patient currently unavailable 2/2 BOB for MRI. Will defer and continue to follow for OT evaluation. Thank you.   Wes Palmer OTR/L
PHYSICAL THERAPY EVALUATION/DISCHARGE    Patient: Naresh Mc (51 y.o. female)  Date: 2023  Primary Diagnosis: TIA (transient ischemic attack) [G45.9]  Paresthesia [R20.2]  CVA (cerebrovascular accident due to intracerebral hemorrhage) (HCC) [I61.9]  Stenosis of left carotid artery [I65.22]       Precautions:       ASSESSMENT AND RECOMMENDATIONS:  Based on the objective data below, the patient presents with indep LOF with mobility following admit for neuro work up due to 3 week history of intermittent L UE/ facial numbness and difficulty speech. CTA showed mild stenosis L carotid bifurcation; brain MRI showed small subacute white matter frontal infarct and prominent white matter disease for age. Pt demo indep with bed mob, transfers, and amb on unit. Noted instability with high level balance testing involving SLS/ narrow ANA. Pt without acute PT needs at this time. Discussed follow up outpt PT for high level balance training, pt not interested at this time. Further skilled acute physical therapy is not indicated at this time. PLAN :  Recommendation for discharge: (in order for the patient to meet his/her long term goals): No skilled physical therapy    Other factors to consider for discharge: no additional factors    IF patient discharges home will need the following DME: none       SUBJECTIVE:   Patient stated I just have a headache.     OBJECTIVE DATA SUMMARY:     Past Medical History:   Diagnosis Date    CAD (coronary artery disease)     Apical LAD disease only (): Med Rx. Depression     Previous rx.     Diabetes mellitus ([facility] [address]     since 17 yo; ?type 1 but takes metformin    Hyperlipidemia     Hypertension     Pancreatitis     Panic disorder (episodic paroxysmal anxiety) without agoraphobia      Past Surgical History:   Procedure Laterality Date    CARDIAC CATHETERIZATION       SECTION      X 2    GI      COLONOSCOPY    GYN      ablation    HYSTERECTOMY (CERVIX STATUS UNKNOWN)
Physical Therapy    Order acknowledged, chart reviewed. Pt unavailable for PT evaluation due to off floor for MRI. Will con't to follow.     Garrett Marsh, PT, MPT
Speech LAnguage Pathology EVALUATION    Patient: Vandana Miller (51 y.o. female)  Date: 2023  Primary Diagnosis: TIA (transient ischemic attack) [G45.9]  Paresthesia [R20.2]  CVA (cerebrovascular accident due to intracerebral hemorrhage) ([facility] [address] [I61.9]  Stenosis of left carotid artery [I65.22]       Precautions:                     ASSESSMENT :  Based on the objective data described below, the patient presents with functional motor speech and basic language. Auditory comprehension for complex commands and questions was wnl. R/L discrim was wnl. Verbal expression was wnl. Naming on confrontation was wnl. She had mildly reduced divergent naming in one of 2 categories. Repetition of complex words and sentences was wnl. She wrote her name and address accurately and a few sentences. One spelling error noted. She felt like she had fully resolved. She has risk factors for stroke and her MRI showed subacute L frontal infarct. She has had symptoms over the past month. Patient will be discharged from skilled speech-language pathology services at this time. PLAN :  Recommendations and Planned Interventions:  Diet: Regular  thins     Acute SLP Services: No, patient will be discharged from acute skilled speech-language pathology at this time. Discharge Recommendations: None     SUBJECTIVE:   Patient stated, she was a title . OBJECTIVE:     Past Medical History:   Diagnosis Date    CAD (coronary artery disease)     Apical LAD disease only (): Med Rx. Depression     Previous rx.     Diabetes mellitus ([facility] [address]     since 19 yo; ?type 1 but takes metformin    Hyperlipidemia     Hypertension     Pancreatitis     Panic disorder (episodic paroxysmal anxiety) without agoraphobia      Past Surgical History:   Procedure Laterality Date    CARDIAC CATHETERIZATION       SECTION      X 2    GI      COLONOSCOPY    GYN      ablation    HYSTERECTOMY      TONSILLECTOMY      UPPER GI
bed, PT/OT consult, and nutritional support    Patient Safety:  Fall Score:  Cid Total Score: 0  Interventions: bed/chair alarm, gripper socks, and pt to call before getting OOB     @Rollbelt  @dexterity to release roll belt  Yes/No ( must document dexterity  here by stating Yes or No here, otherwise this is a restraint and must follow restraint documentation policy.)    DVT prophylaxis:  DVT prophylaxis Med- yes  DVT prophylaxis SCD or EVA- no     Wounds: (If Applicable)  Wounds- N/A  Location     Active Consults:  IP CONSULT TO HOSPITALIST  IP CONSULT TO NEUROLOGY    Length of Stay:  Expected LOS: 2  Actual LOS: 0  Discharge Plan:  Yes, when stable      Angie Alexander RN
(PLAVIX) 75 mg tab, Disp: 30 tablet, Rfl: 1    [START ON 6/25/2023] aspirin 81 MG chewable tablet, Take 1 tablet by mouth daily, Disp: 30 tablet, Rfl: 1    aspirin 325 MG tablet, Take 1 tablet by mouth daily, Disp: 30 tablet, Rfl: 0    Semaglutide, 1 MG/DOSE, (OZEMPIC, 1 MG/DOSE,) 2 MG/1.5ML SOPN, Inject 1 mg into the skin once a week, Disp: , Rfl:     insulin glargine (LANTUS SOLOSTAR) 100 UNIT/ML injection pen, 25 unit once daily, ANY FURTHER REFILLS NEED TO COME FROM PCP AS DR DAMON IS NO LONGER WITH THIS PRACTICE, Disp: , Rfl:     metFORMIN (GLUCOPHAGE-XR) 500 MG extended release tablet, TAKE 2 TABLET BY MOUTH BEFORE BREAKFAST AND DINNER, Disp: , Rfl:     sertraline (ZOLOFT) 100 MG tablet, 1.5 tablets, Disp: , Rfl:     butalbital-APAP-caffeine -40 MG CAPS per capsule, Take 1 capsule by mouth every 4 hours as needed, Disp: , Rfl:     ondansetron (ZOFRAN-ODT) 4 MG disintegrating tablet, Take 1 tablet by mouth every 8 hours as needed, Disp: , Rfl:         No Known Allergies   MRI Results (most recent):  @[facility]([email or web address]:1)@    @[facility]([id number]:1)@  Review of Systems:  A comprehensive review of systems was negative except for: Neurological: positive for paresthesia and weakness   Vitals:    05/23/23 2334 05/24/23 0312 05/24/23 0850 05/24/23 1050   BP: (!) 119/55 (!) 108/57 132/63 123/63   Pulse: 69 71 75 72   Resp: 18 16 16 18   Temp: 97.5 °F (36.4 °C) 97.9 °F (36.6 °C) 97.5 °F (36.4 °C) 98.4 °F (36.9 °C)   TempSrc: Oral  Oral Oral   SpO2: 98% 96% 92% 97%   Weight:       Height:       PF:         Objective:     I      NEUROLOGICAL EXAM:    Appearance: The patient is well developed, well nourished, provides a coherent history and is in no acute distress. Mental Status: Oriented to time, place and person, and the president, cognitive function is normal and speech is fluent and no aphasia or dysarthria. Mood and affect appropriate, appears a little depressed. .   Cranial Nerves:   Intact
registered nurse    Patient Education  Education Given To: Patient  Education Provided: Role of Therapy;Plan of Care;Energy Conservation;IADL Safety; Fall Prevention Strategies; ADL Adaptive Strategies  Education Method: Verbal;Demonstration  Barriers to Learning: None  Education Outcome: Verbalized understanding    Thank you for this referral.  Rambo Cooley OT  Minutes: 19    Occupational Therapy Evaluation Charge Determination   History Examination Decision-Making   LOW Complexity : Brief history review  LOW Complexity: 1-3 Performance deficits relating to physical, cognitive, or psychosocial skills that result in activity limitations and/or participation restrictions LOW Complexity: No comorbidities that affect functional and  no verbal  or physical assist needed to complete eval tasks   Based on the above components, the patient evaluation is determined to be of the following complexity level: Low

## 2023-05-24 NOTE — TELEPHONE ENCOUNTER
Pt called. Verified patient with two patient identifiers. States she had numbness right arm, lasted one minute. Denied other symptoms, anywhere else. Is back to normal right after that. Just d/c from hospital.  Lives 1.5 hrs away. Has had her ASA and Plavix today. Spoke with Dr. Abby Alejandre. Advised sometimes there will be minor fluctuations in the symptoms because of the nerves or damage by the stroke. Occasionally tend to misfire and cause symptoms, particularly when it is very brief like that. Advised  if symptoms increase in frequency, severity, etc. Call 911. Patient verbalized understanding.

## 2023-05-24 NOTE — PLAN OF CARE
Problem: Discharge Planning  Goal: Discharge to home or other facility with appropriate resources  5/24/2023 0908 by Prince Ramos RN  Outcome: Progressing  5/24/2023 0217 by Chavez Toney RN  Outcome: Progressing     Problem: Neurosensory - Adult  Goal: Achieves stable or improved neurological status  5/24/2023 0908 by Prince Ramos RN  Outcome: Progressing  5/24/2023 0217 by Chavez Toney RN  Outcome: Progressing  Goal: Absence of seizures  5/24/2023 0908 by Prince Ramos RN  Outcome: Progressing  5/24/2023 0217 by Chavez Toney RN  Outcome: Progressing  Goal: Remains free of injury related to seizures activity  5/24/2023 0908 by Prince Ramos RN  Outcome: Progressing  5/24/2023 0217 by Chavez Toney RN  Outcome: Progressing  Goal: Achieves maximal functionality and self care  5/24/2023 0908 by Prince Ramos RN  Outcome: Progressing  5/24/2023 0217 by Chavez Toney RN  Outcome: Progressing     Problem: Cardiovascular - Adult  Goal: Maintains optimal cardiac output and hemodynamic stability  5/24/2023 0908 by Prince Ramos RN  Outcome: Progressing  5/24/2023 0217 by Chavez Toney RN  Outcome: Progressing  Goal: Absence of cardiac dysrhythmias or at baseline  5/24/2023 0908 by Prince Ramos RN  Outcome: Progressing  5/24/2023 0217 by Chavez Toney RN  Outcome: Progressing     Problem: Musculoskeletal - Adult  Goal: Return mobility to safest level of function  5/24/2023 0908 by Prince Ramos RN  Outcome: Progressing  5/24/2023 0217 by Chavez Toney RN  Outcome: Progressing  Goal: Maintain proper alignment of affected body part  5/24/2023 0908 by Prince Ramos RN  Outcome: Progressing  5/24/2023 0217 by Chavez Toney RN  Outcome: Progressing  Goal: Return ADL status to a safe level of function  5/24/2023 0908 by Prince Ramos RN  Outcome: Progressing  5/24/2023 0217 by Chavez Toney RN  Outcome: Progressing     Problem: Safety - Adult  Goal: Free from fall injury  5/24/2023 0908 by Prince Ramos

## 2023-05-24 NOTE — DISCHARGE INSTRUCTIONS
Patient Discharge Instructions    Chase Davies / [id number] : 1975    Admitted 2023 Discharged: 2023         DISCHARGE DIAGNOSIS:   Acute/subacute CVA involving left frontal lobe  Diabetes mellitus  HTN  MDD/SCAR      Take Home Medications     {Medication reconciliation information is now added to the patient's AVS automatically when it is printed. There is no need to use this SmartLink in discharge instructions. Highlight this text and delete it to clear this message}      General drug facts     If you have a very bad allergy, wear an allergy ID at all times. It is important that you take the medication exactly as they are prescribed. Keep your medication in the bottles provided by the pharmacist.  Keep a list of all your drugs (prescription, natural products, vitamins, OTC) with you. Give this list to your doctor. Do not take other medications without consulting your doctor. Do not share your drugs with others and do not take anyone else's drugs. Keep all drugs out of the reach of children and pets. Most drugs may be thrown away in household trash after mixing with coffee grounds or herrera litter and sealing in a plastic bag. Keep a list Call your doctor for help with any side effects. If in the [place] you may also call the FDA at 9-245-FDA-6110    Talk with the doctor before starting any new drug, including OTC, natural products, or vitamins. What to do at Home    1. Recommended diet: Diabetic     2. Recommended activity: activity as tolerated    3. If you experience any of the following symptoms then please call your primary care physician or return to the emergency room if you cannot get hold of your doctor:    4. Wound Care: None     5. Lab work: cbc and bmp in 1 week     6 . Bring these papers with you to your follow up appointments.  The papers will help your doctors be sure to continue the care plan from the hospital.      If you have questions regarding the hospital

## 2023-05-24 NOTE — PLAN OF CARE
Problem: Discharge Planning  Goal: Discharge to home or other facility with appropriate resources  5/24/2023 1212 by Isadora Samayoa RN  Outcome: Completed  5/24/2023 0908 by Isadora Samayoa RN  Outcome: Progressing  5/24/2023 0217 by Gloria Moon RN  Outcome: Progressing     Problem: Neurosensory - Adult  Goal: Achieves stable or improved neurological status  5/24/2023 1212 by Isadora Samayoa RN  Outcome: Completed  5/24/2023 0908 by Isadora Samayoa RN  Outcome: Progressing  5/24/2023 0217 by Gloria Moon RN  Outcome: Progressing  Goal: Absence of seizures  5/24/2023 1212 by Isadora Samayoa RN  Outcome: Completed  5/24/2023 0908 by Isadora Samayoa RN  Outcome: Progressing  5/24/2023 0217 by Gloria Moon RN  Outcome: Progressing  Goal: Remains free of injury related to seizures activity  5/24/2023 1212 by Isadora Samayoa RN  Outcome: Completed  5/24/2023 0908 by Isadora Samayoa RN  Outcome: Progressing  5/24/2023 0217 by Gloria Moon RN  Outcome: Progressing  Goal: Achieves maximal functionality and self care  5/24/2023 1212 by Isadora Samayoa RN  Outcome: Completed  5/24/2023 0908 by Isadora Samayoa RN  Outcome: Progressing  5/24/2023 0217 by Gloria Moon RN  Outcome: Progressing     Problem: Cardiovascular - Adult  Goal: Maintains optimal cardiac output and hemodynamic stability  5/24/2023 1212 by Isadora Samayoa RN  Outcome: Completed  5/24/2023 0908 by Isadora Samayoa RN  Outcome: Progressing  5/24/2023 0217 by Gloria Moon RN  Outcome: Progressing  Goal: Absence of cardiac dysrhythmias or at baseline  5/24/2023 1212 by Isadora Samayoa RN  Outcome: Completed  5/24/2023 0908 by Isadora Samayoa RN  Outcome: Progressing  5/24/2023 0217 by Gloria Moon RN  Outcome: Progressing     Problem: Musculoskeletal - Adult  Goal: Return mobility to safest level of function  5/24/2023 1212 by Jerusalem Hotter, RN  Outcome: Completed  5/24/2023 0908 by Isadora Samayoa RN  Outcome: Progressing  5/24/2023 0217 by Gloria Moon RN  Outcome:

## 2023-05-25 LAB
CENTROMERE B AB SER-ACNC: <0.2 AI (ref 0–0.9)
CHROMATIN AB SERPL-ACNC: <0.2 AI (ref 0–0.9)
DSDNA AB SER-ACNC: <1 IU/ML (ref 0–9)
ENA JO1 AB SER-ACNC: <0.2 AI (ref 0–0.9)
ENA RNP AB SER-ACNC: 0.5 AI (ref 0–0.9)
ENA SCL70 AB SER-ACNC: <0.2 AI (ref 0–0.9)
ENA SM AB SER-ACNC: <0.2 AI (ref 0–0.9)
ENA SM+RNP AB SER-ACNC: <0.2 AI (ref 0–0.9)
ENA SS-A AB SER-ACNC: <0.2 AI (ref 0–0.9)
ENA SS-B AB SER-ACNC: <0.2 AI (ref 0–0.9)
RIBOSOMAL P AB SER-ACNC: <0.2 AI (ref 0–0.9)
SEE BELOW:, 164879: NORMAL

## 2023-07-26 ENCOUNTER — TELEPHONE (OUTPATIENT)
Age: 48
End: 2023-07-26

## 2023-07-26 NOTE — TELEPHONE ENCOUNTER
Pt calling to request refills on the following:    isosorbide mononitrate (IMDUR) 30 MG extended release tablet    lisinopril (PRINIVIL;ZESTRIL) 10 MG tablet    metoprolol succinate (TOPROL XL) 25 MG extended release tablet      To be sent to: [facility] [place] [facility] [phone] -  [phone]

## 2023-07-27 NOTE — TELEPHONE ENCOUNTER
Spoke with patient. Verified patient with two patient identifiers. Advised to call cardiologist or PCP to fill these meds, we do not fill these. Patient verbalized understanding.         Nata Delgado MD  You 17 hours ago (9:29 PM)     TS  Yes, I agree send these to the PCP

## 2023-08-09 ENCOUNTER — OFFICE VISIT (OUTPATIENT)
Age: 48
End: 2023-08-09
Payer: COMMERCIAL

## 2023-08-09 VITALS
DIASTOLIC BLOOD PRESSURE: 62 MMHG | WEIGHT: 174 LBS | RESPIRATION RATE: 18 BRPM | HEIGHT: 64 IN | OXYGEN SATURATION: 98 % | BODY MASS INDEX: 29.71 KG/M2 | SYSTOLIC BLOOD PRESSURE: 92 MMHG | TEMPERATURE: 97.2 F | HEART RATE: 69 BPM

## 2023-08-09 DIAGNOSIS — R47.89 WORD FINDING DIFFICULTY: ICD-10-CM

## 2023-08-09 DIAGNOSIS — I69.328 SPEECH AND LANGUAGE DEFICIT AS LATE EFFECT OF CEREBROVASCULAR ACCIDENT (CVA): ICD-10-CM

## 2023-08-09 DIAGNOSIS — I63.81 STROKE, LACUNAR (HCC): Primary | ICD-10-CM

## 2023-08-09 DIAGNOSIS — R51.9 MILD HEADACHE: ICD-10-CM

## 2023-08-09 PROCEDURE — 99214 OFFICE O/P EST MOD 30 MIN: CPT | Performed by: NURSE PRACTITIONER

## 2023-08-09 PROCEDURE — 3078F DIAST BP <80 MM HG: CPT | Performed by: NURSE PRACTITIONER

## 2023-08-09 PROCEDURE — 3074F SYST BP LT 130 MM HG: CPT | Performed by: NURSE PRACTITIONER

## 2023-08-09 RX ORDER — EZETIMIBE 10 MG/1
TABLET ORAL
COMMUNITY
Start: 2023-07-18

## 2023-08-09 ASSESSMENT — PATIENT HEALTH QUESTIONNAIRE - PHQ9
2. FEELING DOWN, DEPRESSED OR HOPELESS: 1
SUM OF ALL RESPONSES TO PHQ QUESTIONS 1-9: 2
SUM OF ALL RESPONSES TO PHQ9 QUESTIONS 1 & 2: 2
SUM OF ALL RESPONSES TO PHQ QUESTIONS 1-9: 2
1. LITTLE INTEREST OR PLEASURE IN DOING THINGS: 0
SUM OF ALL RESPONSES TO PHQ QUESTIONS 1-9: 0
SUM OF ALL RESPONSES TO PHQ QUESTIONS 1-9: 0
SUM OF ALL RESPONSES TO PHQ QUESTIONS 1-9: 2
2. FEELING DOWN, DEPRESSED OR HOPELESS: 0
SUM OF ALL RESPONSES TO PHQ9 QUESTIONS 1 & 2: 0
SUM OF ALL RESPONSES TO PHQ QUESTIONS 1-9: 0
1. LITTLE INTEREST OR PLEASURE IN DOING THINGS: 1
SUM OF ALL RESPONSES TO PHQ QUESTIONS 1-9: 0
SUM OF ALL RESPONSES TO PHQ QUESTIONS 1-9: 2

## 2023-08-09 ASSESSMENT — ENCOUNTER SYMPTOMS: PHOTOPHOBIA: 0

## 2023-08-09 NOTE — PROGRESS NOTES
Chief Complaint   Patient presents with    Follow-Up from [facility] - 5/22-5/24/23 for acute CVA-Still having weakness in right arm - having speech issues where words run together - short term memory loss      1. Have you been to the ER, urgent care clinic since your last visit? Hospitalized since your last visit? No     2. Have you seen or consulted any other health care providers outside of the [address] [place] [address] Avenue since your last visit? Include any pap smears or colon screening.   No
Diagnostic images were reviewed with patient, she is to continue the 81 mg aspirin and Plavix 75 mg as recommended by cardiology. Healthy lifestyle was encouraged. Her stroke risk factors were reviewed to include her history of cardiac disease and diabetes. This patient language deficit as late effect CVA: Patient notes this not to be a problematic issue however if any worsening of her signs or symptoms or issues with performing with her IADLs patient to contact me at which time I could order speech therapy. Mild headache: Patient is continue monitor signs and symptoms she will contact us for any worsening presentation. Word finding difficulty: Patient is continue monitoring signs and symptoms, speech therapy if any worsening. Consider    Patient and/or family verbalized understand of all instructions and all questions/concerns were addressed. Safety/side effects of medications discussed. Patient remains a complex patient secondary to polypharmacy, significant comorbid conditions, and use of high-risk medications which complicate the decision making process related to patient's neurologic diagnosis. The patient is to follow up in 9 months, sooner if needed.     Amber Li, MNAOLOP-BC

## 2024-07-25 ENCOUNTER — HOSPITAL ENCOUNTER (EMERGENCY)
Facility: HOSPITAL | Age: 49
Discharge: HOME | End: 2024-07-26

## 2024-07-25 ENCOUNTER — APPOINTMENT (OUTPATIENT)
Facility: HOSPITAL | Age: 49
End: 2024-07-25

## 2024-07-25 DIAGNOSIS — R82.998 LEUKOCYTES IN URINE: ICD-10-CM

## 2024-07-25 DIAGNOSIS — R10.10 UPPER ABDOMINAL PAIN: Primary | ICD-10-CM

## 2024-07-25 DIAGNOSIS — R07.89 ATYPICAL CHEST PAIN: ICD-10-CM

## 2024-07-25 DIAGNOSIS — R74.8 ELEVATED LIPASE: ICD-10-CM

## 2024-07-25 DIAGNOSIS — D72.829 LEUKOCYTOSIS, UNSPECIFIED TYPE: ICD-10-CM

## 2024-07-25 LAB
ALBUMIN SERPL-MCNC: 4.1 G/DL (ref 3.4–5)
ALBUMIN/GLOB SERPL: 1.3 (ref 0.8–1.7)
ALP SERPL-CCNC: 71 U/L (ref 45–117)
ALT SERPL-CCNC: 39 U/L (ref 13–56)
ANION GAP SERPL CALC-SCNC: 6 MMOL/L (ref 3–18)
AST SERPL-CCNC: 36 U/L (ref 10–38)
BASOPHILS # BLD: 0.1 K/UL (ref 0–0.1)
BASOPHILS NFR BLD: 1 % (ref 0–2)
BILIRUB SERPL-MCNC: 0.3 MG/DL (ref 0.2–1)
BUN SERPL-MCNC: 17 MG/DL (ref 7–18)
BUN/CREAT SERPL: 20 (ref 12–20)
CALCIUM SERPL-MCNC: 9.5 MG/DL (ref 8.5–10.1)
CHLORIDE SERPL-SCNC: 105 MMOL/L (ref 100–111)
CO2 SERPL-SCNC: 29 MMOL/L (ref 21–32)
CREAT SERPL-MCNC: 0.85 MG/DL (ref 0.6–1.3)
D DIMER PPP FEU-MCNC: 0.31 UG/ML(FEU)
DIFFERENTIAL METHOD BLD: ABNORMAL
EOSINOPHIL # BLD: 0.4 K/UL (ref 0–0.4)
EOSINOPHIL NFR BLD: 3 % (ref 0–5)
ERYTHROCYTE [DISTWIDTH] IN BLOOD BY AUTOMATED COUNT: 11.9 % (ref 11.6–14.5)
GLOBULIN SER CALC-MCNC: 3.2 G/DL (ref 2–4)
GLUCOSE SERPL-MCNC: 155 MG/DL (ref 74–99)
HCT VFR BLD AUTO: 38.4 % (ref 35–45)
HGB BLD-MCNC: 13.6 G/DL (ref 12–16)
IMM GRANULOCYTES # BLD AUTO: 0.1 K/UL (ref 0–0.04)
IMM GRANULOCYTES NFR BLD AUTO: 1 % (ref 0–0.5)
LIPASE SERPL-CCNC: 131 U/L (ref 13–75)
LYMPHOCYTES # BLD: 6.5 K/UL (ref 0.9–3.6)
LYMPHOCYTES NFR BLD: 45 % (ref 21–52)
MCH RBC QN AUTO: 32.5 PG (ref 24–34)
MCHC RBC AUTO-ENTMCNC: 35.4 G/DL (ref 31–37)
MCV RBC AUTO: 91.9 FL (ref 78–100)
MONOCYTES # BLD: 0.9 K/UL (ref 0.05–1.2)
MONOCYTES NFR BLD: 6 % (ref 3–10)
NEUTS SEG # BLD: 6.4 K/UL (ref 1.8–8)
NEUTS SEG NFR BLD: 44 % (ref 40–73)
NRBC # BLD: 0 K/UL (ref 0–0.01)
NRBC BLD-RTO: 0 PER 100 WBC
PLATELET # BLD AUTO: 322 K/UL (ref 135–420)
PMV BLD AUTO: 10.4 FL (ref 9.2–11.8)
POTASSIUM SERPL-SCNC: 3.7 MMOL/L (ref 3.5–5.5)
PROT SERPL-MCNC: 7.3 G/DL (ref 6.4–8.2)
RBC # BLD AUTO: 4.18 M/UL (ref 4.2–5.3)
SODIUM SERPL-SCNC: 140 MMOL/L (ref 136–145)
TROPONIN I SERPL HS-MCNC: 4 NG/L (ref 0–54)
WBC # BLD AUTO: 14.4 K/UL (ref 4.6–13.2)

## 2024-07-25 PROCEDURE — 99285 EMERGENCY DEPT VISIT HI MDM: CPT

## 2024-07-25 PROCEDURE — 83690 ASSAY OF LIPASE: CPT

## 2024-07-25 PROCEDURE — 71045 X-RAY EXAM CHEST 1 VIEW: CPT

## 2024-07-25 PROCEDURE — 96374 THER/PROPH/DIAG INJ IV PUSH: CPT

## 2024-07-25 PROCEDURE — 80053 COMPREHEN METABOLIC PANEL: CPT

## 2024-07-25 PROCEDURE — 85379 FIBRIN DEGRADATION QUANT: CPT

## 2024-07-25 PROCEDURE — 84484 ASSAY OF TROPONIN QUANT: CPT

## 2024-07-25 PROCEDURE — 85025 COMPLETE CBC W/AUTO DIFF WBC: CPT

## 2024-07-25 PROCEDURE — 6360000002 HC RX W HCPCS: Performed by: NURSE PRACTITIONER

## 2024-07-25 PROCEDURE — 96375 TX/PRO/DX INJ NEW DRUG ADDON: CPT

## 2024-07-25 RX ORDER — MORPHINE SULFATE 4 MG/ML
4 INJECTION, SOLUTION INTRAMUSCULAR; INTRAVENOUS
Status: COMPLETED | OUTPATIENT
Start: 2024-07-25 | End: 2024-07-25

## 2024-07-25 RX ORDER — 0.9 % SODIUM CHLORIDE 0.9 %
1000 INTRAVENOUS SOLUTION INTRAVENOUS ONCE
Status: COMPLETED | OUTPATIENT
Start: 2024-07-26 | End: 2024-07-26

## 2024-07-25 RX ORDER — ONDANSETRON 2 MG/ML
4 INJECTION INTRAMUSCULAR; INTRAVENOUS ONCE
Status: COMPLETED | OUTPATIENT
Start: 2024-07-25 | End: 2024-07-25

## 2024-07-25 RX ADMIN — ONDANSETRON 4 MG: 2 INJECTION INTRAMUSCULAR; INTRAVENOUS at 23:03

## 2024-07-25 RX ADMIN — MORPHINE SULFATE 4 MG: 4 INJECTION, SOLUTION INTRAMUSCULAR; INTRAVENOUS at 23:03

## 2024-07-26 ENCOUNTER — APPOINTMENT (OUTPATIENT)
Facility: HOSPITAL | Age: 49
End: 2024-07-26

## 2024-07-26 VITALS
HEIGHT: 64 IN | WEIGHT: 162 LBS | SYSTOLIC BLOOD PRESSURE: 136 MMHG | BODY MASS INDEX: 27.66 KG/M2 | OXYGEN SATURATION: 96 % | RESPIRATION RATE: 20 BRPM | HEART RATE: 78 BPM | TEMPERATURE: 98.6 F | DIASTOLIC BLOOD PRESSURE: 62 MMHG

## 2024-07-26 LAB
APPEARANCE UR: ABNORMAL
BACTERIA URNS QL MICRO: ABNORMAL /HPF
BILIRUB UR QL: NEGATIVE
COLOR UR: YELLOW
EKG ATRIAL RATE: 71 BPM
EKG DIAGNOSIS: NORMAL
EKG P AXIS: 39 DEGREES
EKG P-R INTERVAL: 178 MS
EKG Q-T INTERVAL: 402 MS
EKG QRS DURATION: 88 MS
EKG QTC CALCULATION (BAZETT): 436 MS
EKG R AXIS: 0 DEGREES
EKG T AXIS: -7 DEGREES
EKG VENTRICULAR RATE: 71 BPM
EPITH CASTS URNS QL MICRO: ABNORMAL /LPF (ref 0–5)
GLUCOSE UR STRIP.AUTO-MCNC: NEGATIVE MG/DL
HGB UR QL STRIP: ABNORMAL
KETONES UR QL STRIP.AUTO: NEGATIVE MG/DL
LEUKOCYTE ESTERASE UR QL STRIP.AUTO: ABNORMAL
NITRITE UR QL STRIP.AUTO: NEGATIVE
OTHER: ABNORMAL
PH UR STRIP: 5 (ref 5–8)
PROT UR STRIP-MCNC: ABNORMAL MG/DL
RBC #/AREA URNS HPF: ABNORMAL /HPF (ref 0–5)
SP GR UR REFRACTOMETRY: 1 (ref 1–1.03)
TROPONIN I SERPL HS-MCNC: 5 NG/L (ref 0–54)
UROBILINOGEN UR QL STRIP.AUTO: 0.2 EU/DL (ref 0.2–1)
WBC URNS QL MICRO: ABNORMAL /HPF (ref 0–5)

## 2024-07-26 PROCEDURE — 81001 URINALYSIS AUTO W/SCOPE: CPT

## 2024-07-26 PROCEDURE — 74177 CT ABD & PELVIS W/CONTRAST: CPT

## 2024-07-26 PROCEDURE — 87086 URINE CULTURE/COLONY COUNT: CPT

## 2024-07-26 PROCEDURE — 6360000004 HC RX CONTRAST MEDICATION: Performed by: NURSE PRACTITIONER

## 2024-07-26 PROCEDURE — 6360000002 HC RX W HCPCS: Performed by: NURSE PRACTITIONER

## 2024-07-26 PROCEDURE — 96375 TX/PRO/DX INJ NEW DRUG ADDON: CPT

## 2024-07-26 PROCEDURE — 84484 ASSAY OF TROPONIN QUANT: CPT

## 2024-07-26 PROCEDURE — 2580000003 HC RX 258: Performed by: NURSE PRACTITIONER

## 2024-07-26 RX ORDER — CEPHALEXIN 500 MG/1
500 CAPSULE ORAL 4 TIMES DAILY
Qty: 28 CAPSULE | Refills: 0 | Status: SHIPPED | OUTPATIENT
Start: 2024-07-26 | End: 2024-08-02

## 2024-07-26 RX ORDER — HYDROMORPHONE HYDROCHLORIDE 1 MG/ML
1 INJECTION, SOLUTION INTRAMUSCULAR; INTRAVENOUS; SUBCUTANEOUS
Status: COMPLETED | OUTPATIENT
Start: 2024-07-26 | End: 2024-07-26

## 2024-07-26 RX ORDER — ONDANSETRON 4 MG/1
4 TABLET, ORALLY DISINTEGRATING ORAL 3 TIMES DAILY PRN
Qty: 21 TABLET | Refills: 0 | Status: SHIPPED | OUTPATIENT
Start: 2024-07-26

## 2024-07-26 RX ORDER — OXYCODONE HYDROCHLORIDE AND ACETAMINOPHEN 5; 325 MG/1; MG/1
1 TABLET ORAL EVERY 6 HOURS PRN
Qty: 12 TABLET | Refills: 0 | Status: SHIPPED | OUTPATIENT
Start: 2024-07-26 | End: 2024-07-28 | Stop reason: ALTCHOICE

## 2024-07-26 RX ADMIN — IOPAMIDOL 100 ML: 612 INJECTION, SOLUTION INTRAVENOUS at 00:44

## 2024-07-26 RX ADMIN — WATER 1000 MG: 1 INJECTION INTRAMUSCULAR; INTRAVENOUS; SUBCUTANEOUS at 02:08

## 2024-07-26 RX ADMIN — SODIUM CHLORIDE 1000 ML: 9 INJECTION, SOLUTION INTRAVENOUS at 00:37

## 2024-07-26 RX ADMIN — HYDROMORPHONE HYDROCHLORIDE 1 MG: 1 INJECTION, SOLUTION INTRAMUSCULAR; INTRAVENOUS; SUBCUTANEOUS at 00:58

## 2024-07-26 NOTE — ED TRIAGE NOTES
Pt ambulatory to ed with complaints of chest pain that radiates to her back. Pt has hx of MI, had 3 stent placed (2020). Pt in NAD att, VSS. Pt also has hx of htn, diabetes, heart disease and TIA. Pt took 2 81mg aspirin PTA.

## 2024-07-26 NOTE — ED PROVIDER NOTES
Percocet for pain as well as liquid diet.  Return precautions given.      FINAL IMPRESSION     1. Upper abdominal pain    2. Atypical chest pain    3. Elevated lipase    4. Leukocytes in urine    5. Leukocytosis, unspecified type            DISPOSITION/PLAN   DISPOSITION Discharge - Pending Orders Complete 07/26/2024 02:00:52 AM           PATIENT REFERRED TO:  Rajesh Ruth MD  [address] [facility] [address] [phone]      As needed, If symptoms worsen    [facility] EMERGENCY DEPT  [address] [facility] [address] [phone]    As needed, If symptoms worsen         DISCHARGE MEDICATIONS:     Medication List        START taking these medications      cephALEXin 500 MG capsule  Commonly known as: KEFLEX  Take 1 capsule by mouth 4 times daily for 7 days     ondansetron 4 MG disintegrating tablet  Commonly known as: ZOFRAN-ODT  Take 1 tablet by mouth 3 times daily as needed for Nausea or Vomiting     oxyCODONE-acetaminophen 5-325 MG per tablet  Commonly known as: Percocet  Take 1 tablet by mouth every 6 hours as needed for Pain for up to 3 days. Intended supply: 3 days. Take lowest dose possible to manage pain Max Daily Amount: 4 tablets            ASK your doctor about these medications      * aspirin 325 MG tablet  Take 1 tablet by mouth daily     * aspirin 81 MG chewable tablet  Take 1 tablet by mouth daily     atorvastatin 80 MG tablet  Commonly known as: LIPITOR  Take 0.5 tablets by mouth daily     butalbital-APAP-caffeine -40 MG Caps per capsule     clopidogrel 75 MG tablet  Commonly known as: PLAVIX  Take 1 tablet by mouth daily ceived the following from [facility]: Outside name: clopidogreL (PLAVIX) 75 mg tab     ezetimibe 10 MG tablet  Commonly known as: ZETIA     isosorbide mononitrate 30 MG extended release tablet  Commonly known as: IMDUR  Take 1 tablet by mouth daily     Lantus SoloStar 100 UNIT/ML injection pen  Generic drug: insulin glargine

## 2024-07-28 ENCOUNTER — HOSPITAL ENCOUNTER (EMERGENCY)
Facility: HOSPITAL | Age: 49
Discharge: HOME | End: 2024-07-28

## 2024-07-28 ENCOUNTER — APPOINTMENT (OUTPATIENT)
Facility: HOSPITAL | Age: 49
End: 2024-07-28

## 2024-07-28 VITALS
HEART RATE: 83 BPM | BODY MASS INDEX: 27.66 KG/M2 | RESPIRATION RATE: 16 BRPM | WEIGHT: 162 LBS | SYSTOLIC BLOOD PRESSURE: 110 MMHG | DIASTOLIC BLOOD PRESSURE: 57 MMHG | HEIGHT: 64 IN | TEMPERATURE: 98 F | OXYGEN SATURATION: 99 %

## 2024-07-28 DIAGNOSIS — R74.8 ELEVATED LIPASE: ICD-10-CM

## 2024-07-28 DIAGNOSIS — R10.13 ABDOMINAL PAIN, EPIGASTRIC: ICD-10-CM

## 2024-07-28 DIAGNOSIS — M54.42 ACUTE LEFT-SIDED LOW BACK PAIN WITH LEFT-SIDED SCIATICA: Primary | ICD-10-CM

## 2024-07-28 LAB
ALBUMIN SERPL-MCNC: 3.6 G/DL (ref 3.4–5)
ALBUMIN/GLOB SERPL: 1.2 (ref 0.8–1.7)
ALP SERPL-CCNC: 47 U/L (ref 45–117)
ALT SERPL-CCNC: 29 U/L (ref 13–56)
ANION GAP SERPL CALC-SCNC: 5 MMOL/L (ref 3–18)
APPEARANCE UR: CLEAR
AST SERPL-CCNC: 20 U/L (ref 10–38)
BACTERIA SPEC CULT: NORMAL
BACTERIA URNS QL MICRO: ABNORMAL /HPF
BASOPHILS # BLD: 0.1 K/UL (ref 0–0.1)
BASOPHILS NFR BLD: 1 % (ref 0–2)
BILIRUB SERPL-MCNC: 0.3 MG/DL (ref 0.2–1)
BILIRUB UR QL: NEGATIVE
BUN SERPL-MCNC: 8 MG/DL (ref 7–18)
BUN/CREAT SERPL: 10 (ref 12–20)
CALCIUM SERPL-MCNC: 8.4 MG/DL (ref 8.5–10.1)
CC UR VC: NORMAL
CHLORIDE SERPL-SCNC: 107 MMOL/L (ref 100–111)
CO2 SERPL-SCNC: 27 MMOL/L (ref 21–32)
COLOR UR: YELLOW
CREAT SERPL-MCNC: 0.8 MG/DL (ref 0.6–1.3)
DIFFERENTIAL METHOD BLD: ABNORMAL
EKG ATRIAL RATE: 81 BPM
EKG DIAGNOSIS: NORMAL
EKG P AXIS: 47 DEGREES
EKG P-R INTERVAL: 166 MS
EKG Q-T INTERVAL: 390 MS
EKG QRS DURATION: 86 MS
EKG QTC CALCULATION (BAZETT): 453 MS
EKG R AXIS: 79 DEGREES
EKG T AXIS: 17 DEGREES
EKG VENTRICULAR RATE: 81 BPM
EOSINOPHIL # BLD: 0.4 K/UL (ref 0–0.4)
EOSINOPHIL NFR BLD: 4 % (ref 0–5)
EPITH CASTS URNS QL MICRO: ABNORMAL /LPF (ref 0–5)
ERYTHROCYTE [DISTWIDTH] IN BLOOD BY AUTOMATED COUNT: 11.8 % (ref 11.6–14.5)
GLOBULIN SER CALC-MCNC: 2.9 G/DL (ref 2–4)
GLUCOSE SERPL-MCNC: 210 MG/DL (ref 74–99)
GLUCOSE UR STRIP.AUTO-MCNC: 100 MG/DL
HCT VFR BLD AUTO: 34 % (ref 35–45)
HGB BLD-MCNC: 12 G/DL (ref 12–16)
HGB UR QL STRIP: NEGATIVE
IMM GRANULOCYTES # BLD AUTO: 0 K/UL (ref 0–0.04)
IMM GRANULOCYTES NFR BLD AUTO: 0 % (ref 0–0.5)
KETONES UR QL STRIP.AUTO: ABNORMAL MG/DL
LEUKOCYTE ESTERASE UR QL STRIP.AUTO: ABNORMAL
LIPASE SERPL-CCNC: 91 U/L (ref 13–75)
LYMPHOCYTES # BLD: 4.4 K/UL (ref 0.9–3.6)
LYMPHOCYTES NFR BLD: 38 % (ref 21–52)
MCH RBC QN AUTO: 32.4 PG (ref 24–34)
MCHC RBC AUTO-ENTMCNC: 35.3 G/DL (ref 31–37)
MCV RBC AUTO: 91.9 FL (ref 78–100)
MONOCYTES # BLD: 0.8 K/UL (ref 0.05–1.2)
MONOCYTES NFR BLD: 7 % (ref 3–10)
NEUTS SEG # BLD: 5.6 K/UL (ref 1.8–8)
NEUTS SEG NFR BLD: 49 % (ref 40–73)
NITRITE UR QL STRIP.AUTO: NEGATIVE
NRBC # BLD: 0 K/UL (ref 0–0.01)
NRBC BLD-RTO: 0 PER 100 WBC
PH UR STRIP: 5.5 (ref 5–8)
PLATELET # BLD AUTO: 281 K/UL (ref 135–420)
PMV BLD AUTO: 10.1 FL (ref 9.2–11.8)
POTASSIUM SERPL-SCNC: 3.6 MMOL/L (ref 3.5–5.5)
PROT SERPL-MCNC: 6.5 G/DL (ref 6.4–8.2)
PROT UR STRIP-MCNC: NEGATIVE MG/DL
RBC # BLD AUTO: 3.7 M/UL (ref 4.2–5.3)
RBC #/AREA URNS HPF: NEGATIVE /HPF (ref 0–5)
SERVICE CMNT-IMP: NORMAL
SODIUM SERPL-SCNC: 139 MMOL/L (ref 136–145)
SP GR UR REFRACTOMETRY: 1.02 (ref 1–1.03)
TROPONIN I SERPL HS-MCNC: 4 NG/L (ref 0–54)
UROBILINOGEN UR QL STRIP.AUTO: 0.2 EU/DL (ref 0.2–1)
WBC # BLD AUTO: 11.4 K/UL (ref 4.6–13.2)
WBC URNS QL MICRO: ABNORMAL /HPF (ref 0–5)

## 2024-07-28 PROCEDURE — 80053 COMPREHEN METABOLIC PANEL: CPT

## 2024-07-28 PROCEDURE — 84484 ASSAY OF TROPONIN QUANT: CPT

## 2024-07-28 PROCEDURE — 96375 TX/PRO/DX INJ NEW DRUG ADDON: CPT

## 2024-07-28 PROCEDURE — 2580000003 HC RX 258: Performed by: PHYSICIAN ASSISTANT

## 2024-07-28 PROCEDURE — 2500000003 HC RX 250 WO HCPCS: Performed by: PHYSICIAN ASSISTANT

## 2024-07-28 PROCEDURE — 85025 COMPLETE CBC W/AUTO DIFF WBC: CPT

## 2024-07-28 PROCEDURE — 6360000002 HC RX W HCPCS: Performed by: PHYSICIAN ASSISTANT

## 2024-07-28 PROCEDURE — 72100 X-RAY EXAM L-S SPINE 2/3 VWS: CPT

## 2024-07-28 PROCEDURE — 83690 ASSAY OF LIPASE: CPT

## 2024-07-28 PROCEDURE — 81001 URINALYSIS AUTO W/SCOPE: CPT

## 2024-07-28 PROCEDURE — 6370000000 HC RX 637 (ALT 250 FOR IP): Performed by: PHYSICIAN ASSISTANT

## 2024-07-28 PROCEDURE — 87086 URINE CULTURE/COLONY COUNT: CPT

## 2024-07-28 PROCEDURE — 96374 THER/PROPH/DIAG INJ IV PUSH: CPT

## 2024-07-28 PROCEDURE — 99285 EMERGENCY DEPT VISIT HI MDM: CPT

## 2024-07-28 RX ORDER — OXYCODONE HYDROCHLORIDE AND ACETAMINOPHEN 5; 325 MG/1; MG/1
1 TABLET ORAL EVERY 6 HOURS PRN
Qty: 6 TABLET | Refills: 0 | Status: SHIPPED | OUTPATIENT
Start: 2024-07-28 | End: 2024-07-31

## 2024-07-28 RX ORDER — MORPHINE SULFATE 4 MG/ML
4 INJECTION, SOLUTION INTRAMUSCULAR; INTRAVENOUS
Status: COMPLETED | OUTPATIENT
Start: 2024-07-28 | End: 2024-07-28

## 2024-07-28 RX ORDER — LIDOCAINE 4 G/G
1 PATCH TOPICAL ONCE
Status: DISCONTINUED | OUTPATIENT
Start: 2024-07-28 | End: 2024-07-28 | Stop reason: HOSPADM

## 2024-07-28 RX ORDER — ONDANSETRON 2 MG/ML
4 INJECTION INTRAMUSCULAR; INTRAVENOUS
Status: COMPLETED | OUTPATIENT
Start: 2024-07-28 | End: 2024-07-28

## 2024-07-28 RX ORDER — METHOCARBAMOL 750 MG/1
750 TABLET, FILM COATED ORAL 4 TIMES DAILY
Qty: 20 TABLET | Refills: 0 | Status: SHIPPED | OUTPATIENT
Start: 2024-07-28 | End: 2024-08-02

## 2024-07-28 RX ORDER — LIDOCAINE 50 MG/G
1 PATCH TOPICAL DAILY
Qty: 30 PATCH | Refills: 0 | Status: SHIPPED | OUTPATIENT
Start: 2024-07-28

## 2024-07-28 RX ADMIN — FAMOTIDINE 20 MG: 10 INJECTION, SOLUTION INTRAVENOUS at 18:17

## 2024-07-28 RX ADMIN — ONDANSETRON 4 MG: 2 INJECTION INTRAMUSCULAR; INTRAVENOUS at 18:17

## 2024-07-28 RX ADMIN — MORPHINE SULFATE 4 MG: 4 INJECTION, SOLUTION INTRAMUSCULAR; INTRAVENOUS at 18:17

## 2024-07-28 NOTE — ED TRIAGE NOTES
Pt presents ambulatory to ED c/o left upper abd pain that radiates to left side of back and left leg. Pt endorses she is on abx, antiemetic and pain meds for same issue. Pt denies n/v, fever. Endorses chills. Pt reports she has not been urinating a lot. Last BM was last week, unsure of date.

## 2024-07-28 NOTE — ED PROVIDER NOTES
times daily for 5 days As needed for muscle spasms            CONTINUE taking these medications      oxyCODONE-acetaminophen 5-325 MG per tablet  Commonly known as: Percocet  Take 1 tablet by mouth every 6 hours as needed for Pain for up to 3 days. Intended supply: 3 days. Take lowest dose possible to manage pain Max Daily Amount: 4 tablets            ASK your doctor about these medications      * aspirin 325 MG tablet  Take 1 tablet by mouth daily     * aspirin 81 MG chewable tablet  Take 1 tablet by mouth daily     atorvastatin 80 MG tablet  Commonly known as: LIPITOR  Take 0.5 tablets by mouth daily     butalbital-APAP-caffeine -40 MG Caps per capsule     cephALEXin 500 MG capsule  Commonly known as: KEFLEX  Take 1 capsule by mouth 4 times daily for 7 days     clopidogrel 75 MG tablet  Commonly known as: PLAVIX  Take 1 tablet by mouth daily ceived the following from [facility]: Outside name: clopidogreL (PLAVIX) 75 mg tab     ezetimibe 10 MG tablet  Commonly known as: ZETIA     isosorbide mononitrate 30 MG extended release tablet  Commonly known as: IMDUR  Take 1 tablet by mouth daily     Lantus SoloStar 100 UNIT/ML injection pen  Generic drug: insulin glargine     lisinopril 10 MG tablet  Commonly known as: PRINIVIL;ZESTRIL  Take 1 tablet by mouth daily ceived the following from [facility]: Outside name: lisinopriL (PRINIVIL, ZESTRIL) 10 mg tablet     metFORMIN 500 MG extended release tablet  Commonly known as: GLUCOPHAGE-XR     metoprolol succinate 25 MG extended release tablet  Commonly known as: TOPROL XL  Take 1 tablet by mouth 2 times daily     ondansetron 4 MG disintegrating tablet  Commonly known as: ZOFRAN-ODT  Take 1 tablet by mouth 3 times daily as needed for Nausea or Vomiting     Ozempic (1 MG/DOSE) 2 MG/1.5ML Sopn  Generic drug: Semaglutide (1 MG/DOSE)     sertraline 100 MG tablet  Commonly known as: ZOLOFT           * This list has 2 medication(s) that are

## 2024-07-30 LAB
BACTERIA SPEC CULT: NORMAL
SERVICE CMNT-IMP: NORMAL

## 2024-08-07 LAB
EKG ATRIAL RATE: 71 BPM
EKG ATRIAL RATE: 73 BPM
EKG ATRIAL RATE: 81 BPM
EKG DIAGNOSIS: NORMAL
EKG P AXIS: 24 DEGREES
EKG P AXIS: 39 DEGREES
EKG P AXIS: 47 DEGREES
EKG P-R INTERVAL: 162 MS
EKG P-R INTERVAL: 166 MS
EKG P-R INTERVAL: 178 MS
EKG Q-T INTERVAL: 390 MS
EKG Q-T INTERVAL: 398 MS
EKG Q-T INTERVAL: 402 MS
EKG QRS DURATION: 82 MS
EKG QRS DURATION: 86 MS
EKG QRS DURATION: 88 MS
EKG QTC CALCULATION (BAZETT): 436 MS
EKG QTC CALCULATION (BAZETT): 438 MS
EKG QTC CALCULATION (BAZETT): 453 MS
EKG R AXIS: -8 DEGREES
EKG R AXIS: 0 DEGREES
EKG R AXIS: 79 DEGREES
EKG T AXIS: -7 DEGREES
EKG T AXIS: 17 DEGREES
EKG T AXIS: 39 DEGREES
EKG VENTRICULAR RATE: 71 BPM
EKG VENTRICULAR RATE: 73 BPM
EKG VENTRICULAR RATE: 81 BPM